# Patient Record
Sex: FEMALE | Race: WHITE
[De-identification: names, ages, dates, MRNs, and addresses within clinical notes are randomized per-mention and may not be internally consistent; named-entity substitution may affect disease eponyms.]

---

## 2020-09-21 ENCOUNTER — HOSPITAL ENCOUNTER (EMERGENCY)
Dept: HOSPITAL 62 - ER | Age: 35
Discharge: ANOTHER HOSPITAL | End: 2020-09-21
Payer: MEDICAID

## 2020-09-21 VITALS — SYSTOLIC BLOOD PRESSURE: 116 MMHG | DIASTOLIC BLOOD PRESSURE: 58 MMHG

## 2020-09-21 DIAGNOSIS — Z79.899: ICD-10-CM

## 2020-09-21 DIAGNOSIS — J18.9: ICD-10-CM
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
## 2020-09-21 DIAGNOSIS — N17.9: ICD-10-CM

## 2020-09-21 DIAGNOSIS — R06.03: ICD-10-CM

## 2020-09-21 DIAGNOSIS — K92.2: Primary | ICD-10-CM

## 2020-09-21 DIAGNOSIS — Z20.828: ICD-10-CM

## 2020-09-21 DIAGNOSIS — R41.82: ICD-10-CM

## 2020-09-21 DIAGNOSIS — R00.0: ICD-10-CM

## 2020-09-21 DIAGNOSIS — I48.91: ICD-10-CM

## 2020-09-21 DIAGNOSIS — T68.XXXA: ICD-10-CM

## 2020-09-21 LAB
ADD MANUAL DIFF: NO
ADD MANUAL MICROSCOPIC: YES
ALBUMIN SERPL-MCNC: 4.6 G/DL (ref 3.5–5)
ALP SERPL-CCNC: 96 U/L (ref 38–126)
ANION GAP SERPL CALC-SCNC: 16 MMOL/L (ref 5–19)
APPEARANCE UR: (no result)
APTT PPP: YELLOW S
ARTERIAL BLOOD FIO2: (no result)
ARTERIAL BLOOD H2CO3: 1.78 MMOL/L (ref 1.05–1.35)
ARTERIAL BLOOD HCO3: 22.8 MMOL/L (ref 20–24)
ARTERIAL BLOOD PCO2: 59 MMHG (ref 35–45)
ARTERIAL BLOOD PH: 7.2 (ref 7.35–7.45)
ARTERIAL BLOOD PO2: 100.7 MMHG (ref 80–100)
ARTERIAL BLOOD TOTAL CO2: 24.6 MMOL/L (ref 21–25)
AST SERPL-CCNC: 41 U/L (ref 14–36)
BARBITURATES UR QL SCN: NEGATIVE
BASE EXCESS BLDA CALC-SCNC: -6.1 MMOL/L
BASOPHILS # BLD AUTO: 0.1 10^3/UL (ref 0–0.2)
BASOPHILS NFR BLD AUTO: 0.3 % (ref 0–2)
BILIRUB DIRECT SERPL-MCNC: 0.4 MG/DL (ref 0–0.4)
BILIRUB SERPL-MCNC: 0.5 MG/DL (ref 0.2–1.3)
BILIRUB UR QL STRIP: NEGATIVE
BUN SERPL-MCNC: 22 MG/DL (ref 7–20)
CALCIUM: 9 MG/DL (ref 8.4–10.2)
CHLORIDE SERPL-SCNC: 107 MMOL/L (ref 98–107)
CK MB SERPL-MCNC: 7.83 NG/ML (ref ?–4.55)
CK SERPL-CCNC: 369 U/L (ref 30–135)
CO2 SERPL-SCNC: 23 MMOL/L (ref 22–30)
EOSINOPHIL # BLD AUTO: 0 10^3/UL (ref 0–0.6)
EOSINOPHIL NFR BLD AUTO: 0.1 % (ref 0–6)
ERYTHROCYTE [DISTWIDTH] IN BLOOD BY AUTOMATED COUNT: 14.8 % (ref 11.5–14)
ETHANOL SERPL-MCNC: < 10 MG/DL
GLUCOSE SERPL-MCNC: 102 MG/DL (ref 75–110)
GLUCOSE UR STRIP-MCNC: NEGATIVE MG/DL
HCT VFR BLD CALC: 44.1 % (ref 36–47)
HGB BLD-MCNC: 14.6 G/DL (ref 12–15.5)
HYALINE CASTS #/AREA URNS LPF: (no result) /LPF
INR PPP: 1
KETONES UR STRIP-MCNC: NEGATIVE MG/DL
LYMPHOCYTES # BLD AUTO: 1.1 10^3/UL (ref 0.5–4.7)
LYMPHOCYTES NFR BLD AUTO: 5.9 % (ref 13–45)
MCH RBC QN AUTO: 30.4 PG (ref 27–33.4)
MCHC RBC AUTO-ENTMCNC: 33.1 G/DL (ref 32–36)
MCV RBC AUTO: 92 FL (ref 80–97)
METHADONE UR QL SCN: NEGATIVE
MONOCYTES # BLD AUTO: 0.9 10^3/UL (ref 0.1–1.4)
MONOCYTES NFR BLD AUTO: 4.7 % (ref 3–13)
NEUTROPHILS # BLD AUTO: 16.5 10^3/UL (ref 1.7–8.2)
NEUTS SEG NFR BLD AUTO: 89 % (ref 42–78)
NITRITE UR QL STRIP: NEGATIVE
PCP UR QL SCN: NEGATIVE
PH UR STRIP: 5 [PH] (ref 5–9)
PLATELET # BLD: 232 10^3/UL (ref 150–450)
POTASSIUM SERPL-SCNC: 4.8 MMOL/L (ref 3.6–5)
PROT SERPL-MCNC: 7.9 G/DL (ref 6.3–8.2)
PROT UR STRIP-MCNC: 30 MG/DL
PROTHROMBIN TIME: 13.4 SEC (ref 11.4–15.4)
RBC # BLD AUTO: 4.81 10^6/UL (ref 3.72–5.28)
RBC #/AREA URNS HPF: (no result) /HPF
SAO2 % BLDA: 96.2 % (ref 94–98)
SP GR UR STRIP: 1.02
TOTAL CELLS COUNTED % (AUTO): 100 %
TROPONIN I SERPL-MCNC: 0.07 NG/ML
URINE AMPHETAMINES SCREEN: NEGATIVE
URINE BENZODIAZEPINES SCREEN: (no result)
URINE COCAINE SCREEN: NEGATIVE
URINE MARIJUANA (THC) SCREEN: (no result)
UROBILINOGEN UR-MCNC: NEGATIVE MG/DL (ref ?–2)
WBC # BLD AUTO: 18.5 10^3/UL (ref 4–10.5)
WBC #/AREA URNS HPF: (no result) /HPF

## 2020-09-21 PROCEDURE — 81001 URINALYSIS AUTO W/SCOPE: CPT

## 2020-09-21 PROCEDURE — 82803 BLOOD GASES ANY COMBINATION: CPT

## 2020-09-21 PROCEDURE — 80307 DRUG TEST PRSMV CHEM ANLYZR: CPT

## 2020-09-21 PROCEDURE — 83690 ASSAY OF LIPASE: CPT

## 2020-09-21 PROCEDURE — 93005 ELECTROCARDIOGRAM TRACING: CPT

## 2020-09-21 PROCEDURE — 85610 PROTHROMBIN TIME: CPT

## 2020-09-21 PROCEDURE — 96368 THER/DIAG CONCURRENT INF: CPT

## 2020-09-21 PROCEDURE — 96367 TX/PROPH/DG ADDL SEQ IV INF: CPT

## 2020-09-21 PROCEDURE — 84703 CHORIONIC GONADOTROPIN ASSAY: CPT

## 2020-09-21 PROCEDURE — 94660 CPAP INITIATION&MGMT: CPT

## 2020-09-21 PROCEDURE — 96375 TX/PRO/DX INJ NEW DRUG ADDON: CPT

## 2020-09-21 PROCEDURE — 99291 CRITICAL CARE FIRST HOUR: CPT

## 2020-09-21 PROCEDURE — 96365 THER/PROPH/DIAG IV INF INIT: CPT

## 2020-09-21 PROCEDURE — 85025 COMPLETE CBC W/AUTO DIFF WBC: CPT

## 2020-09-21 PROCEDURE — 80053 COMPREHEN METABOLIC PANEL: CPT

## 2020-09-21 PROCEDURE — 70450 CT HEAD/BRAIN W/O DYE: CPT

## 2020-09-21 PROCEDURE — 83735 ASSAY OF MAGNESIUM: CPT

## 2020-09-21 PROCEDURE — 96376 TX/PRO/DX INJ SAME DRUG ADON: CPT

## 2020-09-21 PROCEDURE — C9113 INJ PANTOPRAZOLE SODIUM, VIA: HCPCS

## 2020-09-21 PROCEDURE — 93010 ELECTROCARDIOGRAM REPORT: CPT

## 2020-09-21 PROCEDURE — 96361 HYDRATE IV INFUSION ADD-ON: CPT

## 2020-09-21 PROCEDURE — 51702 INSERT TEMP BLADDER CATH: CPT

## 2020-09-21 PROCEDURE — 71045 X-RAY EXAM CHEST 1 VIEW: CPT

## 2020-09-21 PROCEDURE — 82550 ASSAY OF CK (CPK): CPT

## 2020-09-21 PROCEDURE — 36600 WITHDRAWAL OF ARTERIAL BLOOD: CPT

## 2020-09-21 PROCEDURE — 87040 BLOOD CULTURE FOR BACTERIA: CPT

## 2020-09-21 PROCEDURE — 83605 ASSAY OF LACTIC ACID: CPT

## 2020-09-21 PROCEDURE — 96366 THER/PROPH/DIAG IV INF ADDON: CPT

## 2020-09-21 PROCEDURE — 87635 SARS-COV-2 COVID-19 AMP PRB: CPT

## 2020-09-21 PROCEDURE — C9803 HOPD COVID-19 SPEC COLLECT: HCPCS

## 2020-09-21 PROCEDURE — 82553 CREATINE MB FRACTION: CPT

## 2020-09-21 PROCEDURE — 84484 ASSAY OF TROPONIN QUANT: CPT

## 2020-09-21 PROCEDURE — 36415 COLL VENOUS BLD VENIPUNCTURE: CPT

## 2020-09-21 NOTE — RADIOLOGY REPORT (SQ)
EXAM DESCRIPTION:  CHEST SINGLE VIEW



IMAGES COMPLETED DATE/TIME:  9/21/2020 1:12 pm



REASON FOR STUDY:  ET AND NG TUBE PLACEMENT



COMPARISON:  None.



EXAM PARAMETERS:  NUMBER OF VIEWS: One view.

TECHNIQUE: Single frontal radiographic view of the chest acquired.

RADIATION DOSE: NA

LIMITATIONS: None.



FINDINGS:  LUNGS AND PLEURA: Infiltrate in the right base consistent with atelectasis or pneumonia.  
No definite effusion.  No pneumothorax.  Endotracheal tube and NG tube are in satisfactory position. 
 Despite this there is gastric distention on the current study.

MEDIASTINUM AND HILAR STRUCTURES: No masses.  Contour normal.

HEART AND VASCULAR STRUCTURES: Heart normal in size.  Normal vasculature.

BONES: No acute findings.

HARDWARE: None in the chest.

OTHER: No other significant finding.



IMPRESSION:  1. Support lines and tubes are in satisfactory position.  There is gastric distention.

2.  Right lower lobe airspace disease consistent with atelectasis or pneumonia.



TECHNICAL DOCUMENTATION:  JOB ID:  [id number]

 2011 [employer]- All Rights Reserved



Reading location - IP/workstation name: [email or web address]

## 2020-09-21 NOTE — RADIOLOGY REPORT (SQ)
EXAM DESCRIPTION:  CT HEAD WITHOUT



IMAGES COMPLETED DATE/TIME:  9/21/2020 2:34 pm



REASON FOR STUDY:  ams



COMPARISON:  None.



TECHNIQUE:  Axial images acquired through the brain without intravenous contrast.  Images reviewed wi
th bone, brain and subdural windows.  Additional sagittal and coronal reconstructions were generated.
 Images stored on PACS.

All CT scanners at this facility use dose modulation, iterative reconstruction, and/or weight based d
osing when appropriate to reduce radiation dose to as low as reasonably achievable (ALARA).

[facility]: Dose Right  [facility]: CareDose    [facility]: Dose Right    [facility]: Teradose 4D    [facility]: Smart Technologies



RADIATION DOSE:  CT Rad equipment meets quality standard of care and radiation dose reduction techniq
ues were employed. CTDIvol: 53.2 mGy. DLP: 1070 mGy-cm. mGy.



LIMITATIONS:  None.



FINDINGS:  VENTRICLES: Normal size and contour.

CEREBRUM: No masses.  No hemorrhage.  No midline shift.  No evidence for acute infarction. Normal gra
y/white matter differentiation. No areas of low density in the white matter.

CEREBELLUM: No masses.  No hemorrhage.  No alteration of density.  No evidence for acute infarction.

EXTRAAXIAL SPACES: No fluid collections.  No masses.

ORBITS AND GLOBE: No intra- or extraconal masses.  Normal contour of globe without masses.

CALVARIUM: No fracture.

PARANASAL SINUSES: No fluid or mucosal thickening.

SOFT TISSUES: No mass or hematoma.

OTHER: No other significant finding.



IMPRESSION:  NORMAL BRAIN CT WITHOUT CONTRAST.

EVIDENCE OF ACUTE STROKE: NO.



COMMENT:  Quality ID # [id number]: Final reports with documentation of one or more dose reduction techniques
 (e.g., Automated exposure control, adjustment of the mA and/or kV according to patient size, use of 
iterative reconstruction technique)



TECHNICAL DOCUMENTATION:  JOB ID:  [id number]

 2011 [employer]- All Rights Reserved



Reading location - IP/workstation name: [email or web address]

## 2020-09-21 NOTE — ER DOCUMENT REPORT
ED General





- General


Chief Complaint: Possible Overdose


Stated Complaint: UNRESPONSIVE


Mode of Arrival: Medic


Information source: Relative, Emergency Med Personnel


Cannot obtain history due to: Intubated


Notes: 





Per EMS patient was found unresponsive at the location secondary to a neighbor. 

They stated that they did give the patient Narcan with minimal response.  

Decision secondary to [place] Coma Scale of 3 to electively intubate was made 

endotracheal tube was secured IV access was secured and patient was brought to 

the emergency department for further evaluation and treatment.  No other history

of present illness or review of systems is obtainable secondary to the patient's

condition.


TRAVEL OUTSIDE OF THE [place] IN LAST 30 DAYS: No





- HPI


Onset: Other - Unknown





- Related Data


Allergies/Adverse Reactions: 


                                        





No Known Allergies Allergy (Unverified 09/21/20 14:20)


   











Past Medical History





- General


Information source: Parent


Cannot obtain history due to: Intubated, Altered mental status





- Social History


Smoking Status: Unknown if Ever Smoked


Drug Abuse: Other - When speaking to the mother she states that the patient did 

have a prior history of drug abuse.


Lives with: Alone


Family History: Reviewed & Not Pertinent


Patient has suicidal ideation: No - Unable to obtain


Patient has homicidal ideation: No - Unable to obtain





Review of Systems





- Review of Systems


-: Yes ROS unobtainable due to patient's medical condition





Physical Exam





- Vital signs


Vitals: 


                                        











Resp Pulse Ox


 


 15   100 


 


 09/21/20 12:53  09/21/20 12:53














- Notes


Notes: 





PHYSICAL EXAMINATION:


 


GENERAL: Patient is a  female of unknown age unresponsive and intubated


 


HEAD: Atraumatic, normocephalic.


 


EYES: Pupils are approximately 2 mm nonreactive conjunctiva are markedly 

injected


 


ENT: nares patent, oropharynx clear without exudates.  Dry mucous membranes.


 


NECK: Normal range of motion, supple without lymphadenopathy, no appreciable JVD

No crepitus


 


LUNGS: Lungs clear to auscultation bilaterally and equal.  No wheezes rales or 

rhonchi. Poor effort


 


HEART: Tachycardia rate and irregular rhythm without murmurs


 


ABDOMEN: Soft, nontender, absent bowel sounds.  No masses appreciated.


 


EXTREMITIES: Active full range of motion, no pitting or edema.  No cyanosis. 2+ 

pulses x4


 


NEUROLOGICAL: GCS of 3 patient does not respond to corneal reflex, sternal rub 

or any other noxious stimuli.


 


SKIN: Cool dry, and intact.  Decreased turgor, no rashes or lesions noted.





Course





- Re-evaluation


Re-evalutation: 





09/21/20 18:42


Patient has been maintained on a cardiac monitor the entire time in the 

emergency department.  Patient has been reevaluated by myself and nursing staff 

multiple times.  Upon presentation IV access was obtained EKG was obtained 

urinalysis was obtained via temperature/critical or Molina catheter.  Orogastric 

tube was placed at my request.  Portable chest x-ray demonstrated good placement

of all devices.  EKG demonstrated tachycardic rhythm computer reading it as 

atrial fibrillation.  Please see nurses notes for timing for medications as well

as dosing.  Initially patient needed no medications for sedation.  Once it was 

identified that the patient had elevated leukocyte level and chest x-ray 

demonstrated possible pneumonia patient was given Rocephin and Zithromax per 

protocol.  Patient has received a total of 3 L of IV normal saline warmed fluid 

secondary to the patient being hypothermic while present.  Patient did have a 

bear hugger that was utilized.  Patient has been intermittently given 2 doses of

Ativan and 1 dose of fentanyl for sedation dipper Van was ordered but patient's 

blood pressure has been labile and low.  I did have a chance to speak with the 

patient's mother after the patient had been here for some time and she states 

that the patient has been using a dietary supplement to help lose weight.  

Patient also tends to use BC powders.


1535


I was able to speak with cardiology Dr. Lorenzo and reviewed the patient's EKG he

states that with everything the patient's been through that a catecholamine 

induced atrial fibrillation is entirely possible however he would not recommend 

using medications for this recommendation is to continue hydrating and warming 

the patient.


1605


Mother states that there was some response of the patient to her presents the 

patient was moving her head around.  Patient has since gone back to sleep and is

minimally responsive to noxious stimuli after medications.


1640


I was able to speak with Dr. Pope intensivist at [facility] who is agreeable with accepting the patient in transfer.  A coronavirus 

test has been obtained however not resulted.


1830


We have a bed release for the patient EMS is in route for transport.  Once again

patient remains at baseline.  Mother is aware of the need for transfer and is 

agreeable with same.





- Vital Signs


Vital signs: 


                                        











Temp Pulse Resp BP Pulse Ox


 


       16   89/53 L  100 


 


       09/21/20 17:36  09/21/20 17:36  09/21/20 17:36














- Laboratory


Result Diagrams: 


                                 09/21/20 12:52





                                 09/21/20 12:52


Laboratory results interpreted by me: 


                                        











  09/21/20 09/21/20 09/21/20





  12:52 12:52 12:52


 


WBC  18.5 H  


 


RDW  14.8 H  


 


Lymph % (Auto)  5.9 L  


 


Absolute Neuts (auto)  16.5 H  


 


Seg Neutrophils %  89.0 H  


 


Carbonic Acid   


 


ABG pH   


 


ABG pCO2   


 


ABG pO2   


 


Sodium   145.8 H 


 


BUN   22 H 


 


Creatinine   1.77 H 


 


Est GFR ( Amer)   39 L 


 


Est GFR (MDRD) Non-Af   32 L 


 


Lactic Acid   


 


AST   41 H 


 


Creatine Kinase   369 H 


 


CK-MB (CK-2)    7.83 H


 


Urine Protein   














  09/21/20 09/21/20 09/21/20





  12:52 12:52 15:30


 


WBC   


 


RDW   


 


Lymph % (Auto)   


 


Absolute Neuts (auto)   


 


Seg Neutrophils %   


 


Carbonic Acid    1.78 H


 


ABG pH    7.20 L*


 


ABG pCO2    59.0 H


 


ABG pO2    100.7 H


 


Sodium   


 


BUN   


 


Creatinine   


 


Est GFR ( Amer)   


 


Est GFR (MDRD) Non-Af   


 


Lactic Acid   4.7 H 


 


AST   


 


Creatine Kinase   


 


CK-MB (CK-2)   


 


Urine Protein  30 H  














- Diagnostic Test


Radiology reviewed: Image reviewed, Reports reviewed





- EKG Interpretation by Me


Rate: Tachycardia


Rhythm: A.Fib


When compared to previous EKG there are: Previous EKG unavailable





Critical Care Note





- Critical Care Note


Total time excluding time spent on procedures (mins): 60


Comments: 





Please allow  60    minutes of critical care time  spent obtaining history from 

patient or surrogate, discussions with consultants, development of treatment 

plan with patient or surrogate, evaluation of patient's response to treatment, 

examination of patient.  This also includes ordering and reviewing laboratory, 

EKG and / or  radiologic studies, performing and reassessing treatments and 

interventions as well as reviewing previous visits and old charts.





This is exclusive of separately billable procedures. 








Discharge





- Discharge


Clinical Impression: 


 Respiratory distress, LUIZ (acute kidney injury)





GI bleeding


Qualifiers:


 GI bleed type/associated pathology: unspecified gastrointestinal hemorrhage 

type Qualified Code(s): K92.2 - Gastrointestinal hemorrhage, unspecified





AMS (altered mental status)


Qualifiers:


 Altered mental status type: coma Coma depth: Dinora coma 3-8 Coma timing: in 

the field (EMT or ambulance) Qualified Code(s): R40.2431 - [place] coma scale 

score 3-8, in the field [EMT or ambulance]





Pneumonia


Qualifiers:


 Pneumonia type: due to unspecified organism Laterality: right Lung location: 

lower lobe of lung Qualified Code(s): J18.9 - Pneumonia, unspecified organism





Condition: Serious


Disposition: [facility]

## 2020-09-22 NOTE — EKG REPORT
SEVERITY:- ABNORMAL ECG -

ATRIAL FIBRILLATION

NONSPECIFIC INTRAVENTRICULAR CONDUCTION DELAY

PROBABLE LEFT VENTRICULAR HYPERTROPHY

:

Confirmed by: Ellen Vazquez MD 22-Sep-2020 08:26:10

## 2020-09-25 ENCOUNTER — HOSPITAL ENCOUNTER (EMERGENCY)
Dept: HOSPITAL 62 - ER | Age: 35
Discharge: LEFT BEFORE DISCHARGE | End: 2020-09-25
Payer: MEDICAID

## 2020-09-25 VITALS — SYSTOLIC BLOOD PRESSURE: 119 MMHG | DIASTOLIC BLOOD PRESSURE: 96 MMHG

## 2020-09-25 DIAGNOSIS — T68.XXXA: ICD-10-CM

## 2020-09-25 DIAGNOSIS — R53.1: ICD-10-CM

## 2020-09-25 DIAGNOSIS — R05: ICD-10-CM

## 2020-09-25 DIAGNOSIS — R00.0: ICD-10-CM

## 2020-09-25 DIAGNOSIS — I82.611: ICD-10-CM
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
## 2020-09-25 DIAGNOSIS — M79.89: ICD-10-CM

## 2020-09-25 DIAGNOSIS — Z20.828: ICD-10-CM

## 2020-09-25 DIAGNOSIS — F12.10: ICD-10-CM

## 2020-09-25 DIAGNOSIS — K92.2: Primary | ICD-10-CM

## 2020-09-25 DIAGNOSIS — N17.9: ICD-10-CM

## 2020-09-25 DIAGNOSIS — R40.20: ICD-10-CM

## 2020-09-25 DIAGNOSIS — R50.9: ICD-10-CM

## 2020-09-25 DIAGNOSIS — F17.210: ICD-10-CM

## 2020-09-25 DIAGNOSIS — J18.9: ICD-10-CM

## 2020-09-25 DIAGNOSIS — R06.03: ICD-10-CM

## 2020-09-25 DIAGNOSIS — I82.C11: ICD-10-CM

## 2020-09-25 DIAGNOSIS — Z79.899: ICD-10-CM

## 2020-09-25 DIAGNOSIS — M79.644: ICD-10-CM

## 2020-09-25 DIAGNOSIS — I48.91: ICD-10-CM

## 2020-09-25 DIAGNOSIS — R20.0: ICD-10-CM

## 2020-09-25 DIAGNOSIS — J98.11: ICD-10-CM

## 2020-09-25 LAB
ADD MANUAL DIFF: NO
ALBUMIN SERPL-MCNC: 4.4 G/DL (ref 3.5–5)
ALP SERPL-CCNC: 69 U/L (ref 38–126)
ANION GAP SERPL CALC-SCNC: 10 MMOL/L (ref 5–19)
AST SERPL-CCNC: 57 U/L (ref 14–36)
BASE EXCESS BLDV CALC-SCNC: -2.1 MMOL/L
BASOPHILS # BLD AUTO: 0.1 10^3/UL (ref 0–0.2)
BASOPHILS NFR BLD AUTO: 0.8 % (ref 0–2)
BILIRUB DIRECT SERPL-MCNC: 0.4 MG/DL (ref 0–0.4)
BILIRUB SERPL-MCNC: 0.6 MG/DL (ref 0.2–1.3)
BUN SERPL-MCNC: 6 MG/DL (ref 7–20)
CALCIUM: 9.5 MG/DL (ref 8.4–10.2)
CHLORIDE SERPL-SCNC: 107 MMOL/L (ref 98–107)
CO2 SERPL-SCNC: 24 MMOL/L (ref 22–30)
EOSINOPHIL # BLD AUTO: 0.2 10^3/UL (ref 0–0.6)
EOSINOPHIL NFR BLD AUTO: 1.9 % (ref 0–6)
ERYTHROCYTE [DISTWIDTH] IN BLOOD BY AUTOMATED COUNT: 14.2 % (ref 11.5–14)
GLUCOSE SERPL-MCNC: 97 MG/DL (ref 75–110)
HCO3 BLDV-SCNC: 23.7 MMOL/L (ref 20–32)
HCT VFR BLD CALC: 39.4 % (ref 36–47)
HGB BLD-MCNC: 13.5 G/DL (ref 12–15.5)
INR PPP: 0.98
LYMPHOCYTES # BLD AUTO: 1.5 10^3/UL (ref 0.5–4.7)
LYMPHOCYTES NFR BLD AUTO: 13.4 % (ref 13–45)
MCH RBC QN AUTO: 30.5 PG (ref 27–33.4)
MCHC RBC AUTO-ENTMCNC: 34.3 G/DL (ref 32–36)
MCV RBC AUTO: 89 FL (ref 80–97)
MONOCYTES # BLD AUTO: 1.1 10^3/UL (ref 0.1–1.4)
MONOCYTES NFR BLD AUTO: 9.3 % (ref 3–13)
NEUTROPHILS # BLD AUTO: 8.6 10^3/UL (ref 1.7–8.2)
NEUTS SEG NFR BLD AUTO: 74.6 % (ref 42–78)
PCO2 BLDV: 44.5 MMHG (ref 35–63)
PH BLDV: 7.35 [PH] (ref 7.3–7.42)
PLATELET # BLD: 237 10^3/UL (ref 150–450)
POTASSIUM SERPL-SCNC: 4.2 MMOL/L (ref 3.6–5)
PROT SERPL-MCNC: 7.7 G/DL (ref 6.3–8.2)
PROTHROMBIN TIME: 13.2 SEC (ref 11.4–15.4)
RBC # BLD AUTO: 4.43 10^6/UL (ref 3.72–5.28)
TOTAL CELLS COUNTED % (AUTO): 100 %
WBC # BLD AUTO: 11.5 10^3/UL (ref 4–10.5)

## 2020-09-25 PROCEDURE — 85610 PROTHROMBIN TIME: CPT

## 2020-09-25 PROCEDURE — 93010 ELECTROCARDIOGRAM REPORT: CPT

## 2020-09-25 PROCEDURE — 96375 TX/PRO/DX INJ NEW DRUG ADDON: CPT

## 2020-09-25 PROCEDURE — 96365 THER/PROPH/DIAG IV INF INIT: CPT

## 2020-09-25 PROCEDURE — 96361 HYDRATE IV INFUSION ADD-ON: CPT

## 2020-09-25 PROCEDURE — S0028 INJECTION, FAMOTIDINE, 20 MG: HCPCS

## 2020-09-25 PROCEDURE — 71045 X-RAY EXAM CHEST 1 VIEW: CPT

## 2020-09-25 PROCEDURE — 80053 COMPREHEN METABOLIC PANEL: CPT

## 2020-09-25 PROCEDURE — 99285 EMERGENCY DEPT VISIT HI MDM: CPT

## 2020-09-25 PROCEDURE — 70551 MRI BRAIN STEM W/O DYE: CPT

## 2020-09-25 PROCEDURE — C9113 INJ PANTOPRAZOLE SODIUM, VIA: HCPCS

## 2020-09-25 PROCEDURE — 51702 INSERT TEMP BLADDER CATH: CPT

## 2020-09-25 PROCEDURE — 82803 BLOOD GASES ANY COMBINATION: CPT

## 2020-09-25 PROCEDURE — 82140 ASSAY OF AMMONIA: CPT

## 2020-09-25 PROCEDURE — 36415 COLL VENOUS BLD VENIPUNCTURE: CPT

## 2020-09-25 PROCEDURE — 83605 ASSAY OF LACTIC ACID: CPT

## 2020-09-25 PROCEDURE — 93005 ELECTROCARDIOGRAM TRACING: CPT

## 2020-09-25 PROCEDURE — 70450 CT HEAD/BRAIN W/O DYE: CPT

## 2020-09-25 PROCEDURE — 93971 EXTREMITY STUDY: CPT

## 2020-09-25 PROCEDURE — 85025 COMPLETE CBC W/AUTO DIFF WBC: CPT

## 2020-09-25 PROCEDURE — 87040 BLOOD CULTURE FOR BACTERIA: CPT

## 2020-09-25 PROCEDURE — 96367 TX/PROPH/DG ADDL SEQ IV INF: CPT

## 2020-09-25 NOTE — RADIOLOGY REPORT (SQ)
EXAM DESCRIPTION: 



MR BRAIN WITHOUT IV CONTRAST



COMPLETED DATE/TME:  09/25/2020 19:24 



CLINICAL INDICATION: 35-year-old female with RIGHT upper

extremity numbness status post fall on Monday night, hitting

head. Radial palsy.



COMPARISON: Noncontrast CT brain 9/25/2020.  



TECHNIQUE: Multiplanar, multi-sequence MR imaging of the brain

without intravenous administration of contrast.



FINDINGS:



Incidental note is made of a cystic type focus of CSF signal

intensity at the level of the pineal region measuring 14 mm in

longitudinal dimension and 6 mm in craniocaudal dimension. There

is associated decreased intensity on gradient echo imaging

compatible with calcification, findings of which are suggestive

of a pineal cyst or pineocytoma.



No intraparenchymal abnormal signal is seen on the T2, FLAIR or

diffusion weighted images.



There is no evidence of intracranial hemorrhage, mass or edema.  

Midline structures are within normal limits. 



The ventricles and basal cisterns are normal in size and

configuration.  Major intracranial flow voids are identified. 

The paranasal sinuses and mastoid air cells are patent.



IMPRESSION:

1. No acute MRI findings noted to suggest etiology of the

patient's symptoms.

2. 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

pineocytoma.

## 2020-09-25 NOTE — EKG REPORT
SEVERITY:- OTHERWISE NORMAL ECG -

SINUS TACHYCARDIA

:

Confirmed by: Ellen Vazquez MD 25-Sep-2020 21:19:18

## 2020-09-25 NOTE — ER DOCUMENT REPORT
ED General





- General


Chief Complaint: Altered Mental Status


Stated Complaint: CONFUSION


Time Seen by Provider: 20 18:05


Mode of Arrival: Wheelchair - Via POV to ER.


Information source: Patient, Relative - 


Notes: 


DR GAMING notes 


- General


Chief Complaint: Possible Overdose


Stated Complaint: UNRESPONSIVE


Mode of Arrival: Medic


Information source: Relative, Emergency Med Personnel


Cannot obtain history due to: Intubated


Notes: 





Per EMS patient was found unresponsive at the location secondary to a neighbor. 

They stated that they did give the patient Narcan with minimal response.  

Decision secondary to [place] Coma Scale of 3 to electively intubate was made 

endotracheal tube was secured IV access was secured and patient was brought to 

the emergency department for further evaluation and treatment.  No other history

of present illness or review of systems is obtainable secondary to the patient's

condition.


TRAVEL OUTSIDE OF THE [place] IN LAST 30 DAYS: No





- HPI


Onset: Other - Unknown





- Related Data


Allergies/Adverse Reactions: 


                                        





No Known Allergies Allergy (Unverified 20 14:20)


   











Past Medical History





- General


Information source: Parent


Cannot obtain history due to: Intubated, Altered mental status





- Social History


Smoking Status: Unknown if Ever Smoked


Drug Abuse: Other - When speaking to the mother she states that the patient did 

have a prior history of drug abuse.


Lives with: Alone


Family History: Reviewed & Not Pertinent


Patient has suicidal ideation: No - Unable to obtain


Patient has homicidal ideation: No - Unable to obtain





Review of Systems





- Review of Systems


-: Yes ROS unobtainable due to patient's medical condition





Physical Exam





- Vital signs


Vitals: 


                                        











Resp Pulse Ox


 


 15   100 


 


 20 12:53  20 12:53














- Notes


Notes: 





PHYSICAL EXAMINATION:


 


GENERAL: Patient is a  female of unknown age unresponsive and intubated


 


HEAD: Atraumatic, normocephalic.


 


EYES: Pupils are approximately 2 mm nonreactive conjunctiva are markedly 

injected


 


ENT: nares patent, oropharynx clear without exudates.  Dry mucous membranes.


 


NECK: Normal range of motion, supple without lymphadenopathy, no appreciable JVD

No crepitus


 


LUNGS: Lungs clear to auscultation bilaterally and equal.  No wheezes rales or 

rhonchi. Poor effort


 


HEART: Tachycardia rate and irregular rhythm without murmurs


 


ABDOMEN: Soft, nontender, absent bowel sounds.  No masses appreciated.


 


EXTREMITIES: Active full range of motion, no pitting or edema.  No cyanosis. 2+ 

pulses x4


 


NEUROLOGICAL: GCS of 3 patient does not respond to corneal reflex, sternal rub 

or any other noxious stimuli.


 


SKIN: Cool dry, and intact.  Decreased turgor, no rashes or lesions noted.





Course





- Re-evaluation


Re-evalutation: 





20 18:42


Patient has been maintained on a cardiac monitor the entire time in the 

emergency department.  Patient has been reevaluated by myself and nursing staff 

multiple times.  Upon presentation IV access was obtained EKG was obtained 

urinalysis was obtained via temperature/critical or Molina catheter.  Orogastric 

tube was placed at my request.  Portable chest x-ray demonstrated good placement

of all devices.  EKG demonstrated tachycardic rhythm computer reading it as 

atrial fibrillation.  Please see nurses notes for timing for medications as well

as dosing.  Initially patient needed no medications for sedation.  Once it was 

identified that the patient had elevated leukocyte level and chest x-ray 

demonstrated possible pneumonia patient was given Rocephin and Zithromax per 

protocol.  Patient has received a total of 3 L of IV normal saline warmed fluid 

secondary to the patient being hypothermic while present.  Patient did have a 

bear hugger that was utilized.  Patient has been intermittently given 2 doses of

Ativan and 1 dose of fentanyl for sedation dipper Van was ordered but patient's 

blood pressure has been labile and low.  I did have a chance to speak with the 

patient's mother after the patient had been here for some time and she states 

that the patient has been using a dietary supplement to help lose weight.  

Patient also tends to use BC powders.


1535


I was able to speak with cardiology Dr. Lorenzo and reviewed the patient's EKG he

states that with everything the patient's been through that a catecholamine 

induced atrial fibrillation is entirely possible however he would not recommend 

using medications for this recommendation is to continue hydrating and warming 

the patient.


1605


Mother states that there was some response of the patient to her presents the 

patient was moving her head around.  Patient has since gone back to sleep and is

minimally responsive to noxious stimuli after medications.


1640


I was able to speak with Dr. Pope intensivist at [facility] who is agreeable with accepting the patient in transfer.  A coronavirus 

test has been obtained however not resulted.


1830


We have a bed release for the patient EMS is in route for transport.  Once again

patient remains at baseline.  Mother is aware of the need for transfer and is 

agreeable with same.





- Vital Signs


Vital signs: 


                                        











Temp Pulse Resp BP Pulse Ox


 


       16   89/53 L  100 


 


       20 17:36  20 17:36  20 17:36

















- Diagnostic Test


Radiology reviewed: Image reviewed, Reports reviewed





- EKG Interpretation by Me


Rate: Tachycardia


Rhythm: A.Fib


When compared to previous EKG there are: Previous EKG unavailable











Discharge





- Discharge


Clinical Impression: 


 Respiratory distress, LUIZ (acute kidney injury)





GI bleeding


Qualifiers:


 GI bleed type/associated pathology: unspecified gastrointestinal hemorrhage 

type Qualified Code(s): K92.2 - Gastrointestinal hemorrhage, unspecified





AMS (altered mental status)


Qualifiers:


 Altered mental status type: coma Coma depth: [place] coma 3-8 Coma timing: in 

the field (EMT or ambulance) Qualified Code(s): R40.2431 - [place] coma scale 

score 3-8, in the field [EMT or ambulance]





Pneumonia


Qualifiers:


 Pneumonia type: due to unspecified organism Laterality: right Lung location: 

lower lobe of lung Qualified Code(s): J18.9 - Pneumonia, unspecified organism





Condition: Serious


Disposition: [facility]





TODAYs NOTES 2020











ED Medical Screen 


mary notes





- General


Chief Complaint: Altered Mental Status


Stated Complaint: CONFUSION


Time Seen by Provider: 20 18:05


Notes: 





Patient is a 35-year-old female who presents emergency department with a chief 

complaint of confusion.  Patient was recently discharged from [facility] after having internal bleeding.  She did have a negative COVID test at 

that time.  Significant other states that around 4:30 PM this afternoon she had 

acute onset of confusion.  Patient is febrile.  Patient also has some swelling 

to the right upper extremity.  She did have an IV in the right lower arm which 

did infiltrate during her hospital stay per the family member.  She is having 

right-sided numbness which is also in the arm where the swelling is located.


TRAVEL OUTSIDE OF THE [place] IN LAST 30 DAYS: No





MY NOTES


35-year-old  female arrives to the ER by POV with her migel Duvall as .  Patient had a transfer to [facility] because of GI 

bleed GERD on Monday.  She was doing well after being discharged yesterday from 

[facility] until 1630 today when she became quite disoriented and her 

fianc drove her to the ER.  She has a complaint of numbness of her right arm 

with pain to her middle and ring finger.  She has some wrist drop to her right 

hand.  She is oriented to self and situation year and why she is here.  She is 

tachycardic at 125 bpm.  Patient does advise she smokes marijuana to help her 

sleep at night.  She denies any other illegal drugs.  On last visit she was 

given Narcan by Dr. Gaming.  Patient advises she has 3 teenage children a 

15-year-old who has recently been .  The "parents do not know how this 

occurred" patient advises the other 2 boys 18 and 16 are in another state.  They

live with multiple other people.


Ultrasound Nataly advises the patient has a nonocclusive DVT to her right 

antecubital and right jugular; this was at 1900.  Patient was then sent to x-ray

for chest x-ray and for CT of head.  She was diagnosed with pneumonia while she 

was at [facility] and placed on amoxicillin to go home with.  Jadiel 

advises she has been taking her amoxicillin.


Patient reports she been having night sweats for at least 4 days.  She also 

reporting productive cough but she has been swallowing this rather than spitting

it out.


TRAVEL OUTSIDE OF THE [place] IN LAST 30 DAYS: No





- HPI


Onset: This afternoon


Onset/Duration: Sudden


Quality of pain: Achy


Severity: Moderate


Pain Level: 2


Associated symptoms: Productive cough, Fever, Slow to respond, Weakness


Exacerbated by: Movement, Coughing


Relieved by: Denies


Similar symptoms previously: Yes


Recently seen / treated by doctor: Yes





- Related Data


Allergies/Adverse Reactions: 


                                        





No Known Allergies Allergy (Unverified 20 14:20)


   











Past Medical History





- General


Information source: Patient, Relative





- Social History


Smoking Status: Current Every Day Smoker


Cigarette use (# per day): Yes


Chew tobacco use (# tins/day): No


Smoking Education Provided: Yes


Drug Abuse: Marijuana


Lives with: Family


Family History: Reviewed & Not Pertinent


Patient has suicidal ideation: No


Patient has homicidal ideation: No





Physical Exam





- Vital signs


Vitals: 


                                        











Temp Pulse Resp BP Pulse Ox


 


 101.0 F H  133 H  18   121/73   91 L


 


 20 17:59  20 17:59  20 17:59  20 17:59  20 17:59











Interpretation: Hypotensive, Tachycardic, Febrile





- General


General appearance: Alert





- HEENT


Head: Normocephalic, Atraumatic


Eyes: Normal


Pupils: PERRL





- Respiratory


Respiratory status: Depressed respirations, Tachypnea


Chest status: Nontender


Breath sounds: Normal


Chest palpation: Normal





- Cardiovascular


Rhythm: Tachycardia


Heart sounds: Normal auscultation


Murmur: No





- Abdominal


Inspection: Normal


Distension: No distension


Bowel sounds: Normal


Tenderness: Nontender


Organomegaly: No organomegaly





- Rectal


Hemorrhoids: Other - deferred





- Genitourinary


Bimanuel exam: Other - deferred





- Back


Back: Normal, Nontender





- Extremities


General upper extremity: Normal inspection, Nontender, Normal color, Normal ROM,

Normal temperature


General lower extremity: Normal inspection, Nontender, Normal color, Normal ROM,

Normal temperature, Normal weight bearing.  No: Fredo's sign





- Neurological


Neuro grossly intact: Yes


Cognition: Normal


Orientation: AAOx4


[place] Coma Scale Eye Opening: Spontaneous


[place] Coma Scale Verbal: Oriented


[place] Coma Scale Motor: Obeys Commands


[place] Coma Scale Total: 15


Speech: Normal


Motor strength normal: LUE, RUE, LLE, RLE


Sensory: Normal





- Psychological


Associated symptoms: Anxious





- Skin


Skin Temperature: Warm


Skin Moisture: Moist





Course





- Vital Signs


Vital signs: 


                                        











Temp Pulse Resp BP Pulse Ox


 


 100.0 F   133 H  19   133/70 H  94 


 


 20 21:15  20 17:59  20 21:30  20 21:30  20 21:30














- Laboratory


Result Diagrams: 


                                 20 18:30





                                 20 18:30


Laboratory results interpreted by me: 


                                        











  20





  18:30 18:30


 


WBC  11.5 H 


 


RDW  14.2 H 


 


Absolute Neuts (auto)  8.6 H 


 


BUN   6 L


 


AST   57 H


 


ALT   38 H














Discharge





- Discharge


Clinical Impression: 


 Tachycardia





Pneumonia


Qualifiers:


 Pneumonia type: due to unspecified organism Laterality: right Lung location: 

lower lobe of lung Qualified Code(s): J18.9 - Pneumonia, unspecified organism





Fever


Qualifiers:


 Fever type: unspecified Qualified Code(s): R50.9 - Fever, unspecified





Condition: Fair


Disposition: ADMITTED AS INPATIENT


Admitting Provider: Lori (Hospitalist)


Unit Admitted: [facility]

## 2020-09-25 NOTE — ER DOCUMENT REPORT
ED Medical Screen (RME)





- General


Chief Complaint: Altered Mental Status


Stated Complaint: CONFUSION


Time Seen by Provider: 09/25/20 18:05


Notes: 





Patient is a 35-year-old female who presents emergency department with a chief 

complaint of confusion.  Patient was recently discharged from [facility] after having internal bleeding.  She did have a negative COVID test at 

that time.  Significant other states that around 4:30 PM this afternoon she had 

acute onset of confusion.  Patient is febrile.  Patient also has some swelling 

to the right upper extremity.  She did have an IV in the right lower arm which 

did infiltrate during her hospital stay per the family member.  She is having 

right-sided numbness which is also in the arm where the swelling is located.


TRAVEL OUTSIDE OF THE [place] IN LAST 30 DAYS: No





- Related Data


Allergies/Adverse Reactions: 


                                        





No Known Allergies Allergy (Unverified 09/21/20 14:20)


   











Physical Exam





- Vital signs


Vitals: 





                                        











Temp Pulse Resp BP Pulse Ox


 


 101.0 F H  133 H  18   121/73   91 L


 


 09/25/20 17:59  09/25/20 17:59  09/25/20 17:59  09/25/20 17:59  09/25/20 17:59














Course





- Re-evaluation


Re-evalutation: 





09/25/20 18:12


Patient was noted to be febrile, tachycardic and hypoxic with a oxygen of 91.  

There is no respiratory distress at this time.  Will initiate septic protocol.  

I did notify the charge nurse Jacque with the patient's emergent need of a 

room.  We will also obtain a CT of the head due to acute onset of confusion.





I have greeted and performed a rapid initial assessment of this patient.  A 

comprehensive ED assessment and evaluation of the patient, analysis of test resu

lts and completion of the medical decision making process will be conducted by 

additional ED providers.





- Vital Signs


Vital signs: 





                                        











Temp Pulse Resp BP Pulse Ox


 


 101.0 F H  133 H  18   121/73   91 L


 


 09/25/20 17:59  09/25/20 17:59  09/25/20 17:59  09/25/20 17:59  09/25/20 17:59

## 2020-09-25 NOTE — RADIOLOGY REPORT (SQ)
EXAM DESCRIPTION:  CT HEAD WITHOUT



IMAGES COMPLETED DATE/TIME:  9/25/2020 7:20 pm



REASON FOR STUDY:  Acute onset of confusion 1630 today



COMPARISON:  None.



TECHNIQUE:  Axial images acquired through the brain without intravenous contrast.  Images reviewed wi
th bone, brain and subdural windows.  Additional sagittal and coronal reconstructions were generated.
 Images stored on PACS.

All CT scanners at this facility use dose modulation, iterative reconstruction, and/or weight based d
osing when appropriate to reduce radiation dose to as low as reasonably achievable (ALARA).

[facility]: Dose Right  [facility]: CareDose    [facility]: Dose Right    [facility]: Teradose 4D    [facility]: Smart Quickshift



RADIATION DOSE:  CT Rad equipment meets quality standard of care and radiation dose reduction techniq
ues were employed. CTDIvol: 53.2 mGy. DLP: 884 mGy-cm. mGy.



LIMITATIONS:  None.



FINDINGS:  VENTRICLES: Normal size and contour.

CEREBRUM: No masses.  No hemorrhage.  No midline shift.  No evidence for acute infarction. Normal gra
y/white matter differentiation. No areas of low density in the white matter.

CEREBELLUM: No masses.  No hemorrhage.  No alteration of density.  No evidence for acute infarction.

EXTRAAXIAL SPACES: No fluid collections.  No masses.

ORBITS AND GLOBE: No intra- or extraconal masses.  Normal contour of globe without masses.

CALVARIUM: No fracture.

PARANASAL SINUSES: No fluid or mucosal thickening.

SOFT TISSUES: No mass or hematoma.

OTHER: No other significant finding.



IMPRESSION:  NORMAL BRAIN CT WITHOUT CONTRAST.

EVIDENCE OF ACUTE STROKE: NO.



COMMENT:  Quality ID # 436: Final reports with documentation of one or more dose reduction techniques
 (e.g., Automated exposure control, adjustment of the mA and/or kV according to patient size, use of 
iterative reconstruction technique)



TECHNICAL DOCUMENTATION:  JOB ID:  [id number]

 2011 [employer]- All Rights Reserved



Reading location - IP/workstation name: [id number]

## 2020-09-25 NOTE — RADIOLOGY REPORT (SQ)
EXAM DESCRIPTION:  VENOUS UNILATERAL UPPER



IMAGES COMPLETED DATE/TIME:  9/25/2020 7:15 pm



REASON FOR STUDY:  right upper extremity swelling



COMPARISON:  None.



TECHNIQUE:  Dynamic and static gray scale and color images acquired of the right arm venous system. S
elected spectral images acquired with additional compression and augmentation maneuvers.  Images stor
ed on PACS.



LIMITATIONS:  None.



FINDINGS:  RIGHT

INTERNAL JUGULAR VEIN: Incompletely occlusive hypoechoic clot is present in the right internal jugula
r vein.  Comparison opposite side normal.

SUBCLAVIAN VEIN: Normal compression, augmentation. No visualized echogenic material on gray scale. No
 defects on color images.

AXILLARY VEIN: Normal compression, augmentation. No visualized echogenic material on gray scale. No d
efects on color images.

BRACHIAL VEIN: Normal compression, augmentation. No visualized echogenic material on gray scale. No d
efects on color images.

BASILIC VEIN: Normal compression, augmentation. No visualized echogenic material on gray scale. No de
fects on color images.

CEPHALIC VEIN: Occlusive acute hypoechoic clot is present in the cephalic vein along the antecubital 
fossa.

OTHER: No other significant finding.



IMPRESSION:  Incompletely occlusive hypoechoic clot in the right internal jugular vein

Occlusive hypoechoic clot in the right cephalic vein at the antecubital fossa



TECHNICAL DOCUMENTATION:  JOB ID:  [id number]

 2011 [employer]- All Rights Reserved



Reading location - IP/workstation name: [id number]

## 2020-09-25 NOTE — RADIOLOGY REPORT (SQ)
EXAM DESCRIPTION:  CHEST SINGLE VIEW



IMAGES COMPLETED DATE/TIME:  9/25/2020 7:13 pm



REASON FOR STUDY:  Shortness of breath



COMPARISON:  9/21/2020



EXAM PARAMETERS:  NUMBER OF VIEWS: One view.

TECHNIQUE: Single frontal radiographic view of the chest acquired.

RADIATION DOSE: NA

LIMITATIONS: None.



FINDINGS:  LUNGS AND PLEURA: Minimal bandlike atelectasis above the right hemidiaphragm.  Right basil
ar airspace 9/21/2020 at near completely cleared.

No left-sided focal infiltrates.

No pleural effusion or pneumothorax.

MEDIASTINUM AND HILAR STRUCTURES: No masses.  Contour normal.

HEART AND VASCULAR STRUCTURES: Heart normal in size.  Normal vasculature.

BONES: No acute findings.

HARDWARE: None in the chest.

OTHER: No other significant finding.



IMPRESSION:  Minimal bandlike atelectasis right lung base.



TECHNICAL DOCUMENTATION:  JOB ID:  [id number]

 2011 [employer]- All Rights Reserved



Reading location - IP/workstation name: [id number]

## 2020-09-26 NOTE — PROGRESS NOTE
Provider Note


Provider Note: 


I was contacted by ED physician to admit Ms. Priya Mcginnis for management of 

right upper extremity DVT, fever, night sweats and altered mental status but 

unfortunately patient left AGAINST MEDICAL ADVICE.  





Ms. Mcginnis is a 35-year-old female with likely undiagnosed psychiatric 

disorders, recreational drug abuse, history of incarceration, tobacco abuse, who

was found on the floor by her friend while vomiting blood on 2020, EMS was 

contacted and patient was intubated on site and brought to [facility], patient was di

agnosed with acute GI bleed and was transferred to [facility] 

where she states that by the time she got there the bleeding had stopped and 

they did not do any type of procedures and she does not know why she had GI 

bleed, she is stating that she was told that she had pneumonia and was sent home

on Augmentin.  Patient also stating that she was tested for COVID which was 

reported negative.  Patient was doing fine until yesterday when she was noted by

the family to be disoriented and confused and having right hand weakness and 

right upper extremity swelling. 





Patient was brought to ED and was noted to be tachycardic, tachypneic, and 

febrile as well as right upper extremity swelling and right hand drop, patient 

was suspected of likely having a stroke a CT head was negative followed by MRI 

of the brain which was also negative for any acute stroke.  Patient was started 

on empiric IV antibiotics, Tylenol and breathing treatments. Patient's confusion

resolved however she was still having right wrist drop and right upper extremity

tenderness and swelling.  A stat right upper extremity venous Doppler was 

ordered which showed incompletely occlusive hypoechoic clot in the right 

internal jugular vein, occlusive hypoechoic clot in the right cephalic vein and 

antecubital fossa.  Patient's wrist drop may be explained by compression of 

brachial plexus or radial nerve by blood clot. Patient had also mentioned to the

ED physician of night sweats and a PPD was administered for suspicion of 

possible TB.





On my encounter patient is resting in bed no apparent distress, alert and 

oriented x4, and asking when she can go home.  When asked about her night sweats

she is stating that she was incarcerated in [facility] in the beginning of this year 

and 2 weeks after that she started having night sweats, she is aslo stating that

she has to have her AC at 60 Fahrenheit for her to be able to sleep, she 

mentions that his brother also has tuberculosis and she stated her 15-year-old 

daughter  due to unknown reasons. 





Patient was updated about possibility of having tuberculosis, healthcare 

associated pneumonia and acute upper extremity DVT and she was told that she 

would have to stay in the hospital at least for 72 hours until her PPD is read 

and she is to be started on IV heparin drip as I could not discharge her on oral

anticoagulants as outpatient as she had acute GI bleed and she she could 

potentially it again, I told her it would be much safer if she stayed in the 

hospital and was a started on IV heparin drip and monitored for bleeding 

closely. 





Patient becomes hysterical and starts crying stating that she cannot miss this 

birthday for no reason and she promises to come back to ED as soon as possible. 







I extensively counseled her and her fianc on importance of getting medical care

and staying in the hospital due to underlying acute DVT, possible tuberculosis 

and pneumonia.





Due to the fact that she had a PPD done and she was suspected of having 

tuberculosis I told her that I would have to check with the nursing supervisor 

and health department if it was okay for her to leave AGAINST MEDICAL ADVICE.  

Patient agreed to wait but adamantly refused to stay overnight.





 I [place] [facility] talked to nurse manager on call 

Mrs. Marilia Berg to ask for her guidance regarding pending PPD and leaving 

AMA.  She stated since patients chest x-ray is negative and her only symptoms 

are night sweats and possible exposure to TB she could leave AMA but would have 

to promise to follow-up with health department by Monday. She also stated that 

she will ask [facility] department TB nurse to contact her tomorrow to

arrange for a follow-up.





I talked the patient again and updated her about my conversation with [facility] and again tried to convince her to stay as leaving the 

hospital without receiving any medical treatment could be fatal.  She still 

adamantly refused to stay but promised that she will make sure to follow-up with

[facility] department and come to ED tomorrow afternoon.  I again 

advised her and her fianc who was present in the room to make sure she comes to

ED as soon as possible both voiced understanding.